# Patient Record
Sex: MALE | Race: WHITE
[De-identification: names, ages, dates, MRNs, and addresses within clinical notes are randomized per-mention and may not be internally consistent; named-entity substitution may affect disease eponyms.]

---

## 2018-07-31 ENCOUNTER — HOSPITAL ENCOUNTER (OUTPATIENT)
Dept: HOSPITAL 45 - C.GI | Age: 33
Discharge: HOME | End: 2018-07-31
Attending: INTERNAL MEDICINE
Payer: COMMERCIAL

## 2018-07-31 VITALS
HEIGHT: 75 IN | WEIGHT: 170.35 LBS | BODY MASS INDEX: 21.18 KG/M2 | HEIGHT: 75 IN | BODY MASS INDEX: 21.18 KG/M2 | BODY MASS INDEX: 21.18 KG/M2 | WEIGHT: 170.35 LBS

## 2018-07-31 VITALS — SYSTOLIC BLOOD PRESSURE: 135 MMHG | DIASTOLIC BLOOD PRESSURE: 85 MMHG | OXYGEN SATURATION: 100 % | HEART RATE: 53 BPM

## 2018-07-31 DIAGNOSIS — Z87.19: Primary | ICD-10-CM

## 2018-07-31 DIAGNOSIS — Z91.012: ICD-10-CM

## 2018-07-31 DIAGNOSIS — K44.9: ICD-10-CM

## 2018-07-31 DIAGNOSIS — K22.2: ICD-10-CM

## 2018-07-31 NOTE — DISCHARGE INSTRUCTIONS
Endoscopy Patient Instructions


Date / Procedure(s) Performed


Jul 31, 2018.


EGD





Allergy Information


Coded Allergies:  


     Eggs or Egg-derived Products (Verified  Allergy, Severe, ANAPHYLAXIS, 7/31/ 18)


     NO KNOWN DRUG ALLERGIES (Verified  Allergy, Unknown, NONE, 7/31/18)





Discharge Date / Findings


Jul 31, 2018.


Schatzki's Ring (non-obstructing)


Hiatal hernia


Distal esophageal biopsies





Medication Instructions


OK to resume all medications today as prescribed





Reported Home Medications








 Medications  Dose


 Route/Sig


 Max Daily Dose Days Date Category


 


 Tylenol


  (Acetaminophen)


 325 Mg Tab  650 Mg


 PO PRN


    7/25/18 Reported


 


 Omeprazole 20 Mg


 Tab  1 Tab


 PO BID


    9/6/16 Reported











Provider Instructions





Activity Restrictions





-  No exercising or heavy lifting for 24 hours. 


-  Do not drink alcohol the day of the procedure.


-  Do not drive a car or operate machinery until the day after the procedure.


-  Do not make any important decisions or sign important papers in 24 hours 

after the procedure.





Following Day:





-  Return to full activity which may include returning to work/school.





Diet





Start your diet with liquids and light foods (jello, soup, juice, toast).  Then 

eat your usual diet if not nauseated.





Treatment For Common After Affects





For mild abdominal pain, bloating, or excessive gas:





-  Rest


-  Eat lightly


-  Lie on right side





Follow-Up Information


Follow-up with Dr. Nicloe Díaz as scheduled





Anesthesia Information





What You Should Know





You have had a procedure that required some medicine to reduce anxiety and 

discomfort. This treatment is called moderate sedation.  


After receiving the treatment, you may be sleepy, but you will be able to 

breathe on your own.  The effects of the treatment may last for several hours.








Follow these instructions along with Activity/Diet recommendations noted above:





*  Do NOT do anything where dizziness or clumsiness would be dangerous.





*  Rest quietly at home today, then you can be up and about tomorrow.





*  Have a responsible person stay with you the rest of today.





*  You may have had an I.V. today.  If so, you may take the dressing off later 

today.





Recommendations


 


Call your doctor if:





*  Trouble breathing 





*  Continuous vomiting for more than 24 hours








*  Temperature above 101 degrees





*  Severe abdominal pain or bloating





*  Pain not relieved by pain medicine ordered





*  There is increased drainage or redness from any incision





*  A large amount of rectal bleeding greater than 2-3 tablespoons. 


   (If you had a polyp/s removed or have hemorrhoids, a small amount of blood -


    from the rectum is to be expected.)





*  You have any unanswered questions or concerns.








IN THE EVENT OF A SERIOUS EMERGENCY, GO TO THE NEAREST EMERGENCY ROOM








       Your discharge instructions were prepared by provider Mio Menjivar.





 Patient Instructions Signature Page














Boyd De Jesus 











Patient (or Guardian) Signature/Date:____________________________________ I 

have read and understand the instructions given to me by my caregivers.








Caregiver/RN/Doctor Signature/Date:____________________________________











The above-named patient and/or guardian has received patient instructions on 

this date.





























+  Original Patient Signature Page (only) stays with chart.  Please make copy 

for patient.

## 2018-07-31 NOTE — ENDO HISTORY AND PHYSICAL
History & Physical


Date of Service:


Jul 31, 2018.


Chief Complaint:


hx barretts esophagus


Referring Physician:


Dr. Nicole Díaz


History of Present Illness


32 yo CM who presents for EGD secondary to Vela's Esophagus.





Past Surgical History


Hx Cardiac Surgery:  No


Hx Internal Defibrillator:  No


Hx Pacemaker:  No


Hx Abdominal Surgery:  No


Hx of Implantable Prosthesis:  No


Hx Post-Op Nausea and Vomiting:  No


Hx Cancer Surgery:  No


Hx Thoracic Surgery:  No


Hx Orthopedic:  No


Hx Urinary Tract Surgery:  No





Family History


IBD





Social History


Smoking Status:  Never Smoker


Hx Substance Use:  No


Hx Alcohol Use:  Yes (OCC SOCIAL)





Allergies


Coded Allergies:  


     Eggs or Egg-derived Products (Verified  Allergy, Severe, ANAPHYLAXIS, 7/31/ 18)


     NO KNOWN DRUG ALLERGIES (Verified  Allergy, Unknown, NONE, 7/31/18)





Current Medications





Reported Home Medications








 Medications  Dose


 Route/Sig


 Max Daily Dose Days Date Category


 


 Tylenol


  (Acetaminophen)


 325 Mg Tab  650 Mg


 PO PRN


    7/25/18 Reported


 


 Omeprazole 20 Mg


 Tab  1 Tab


 PO BID


    9/6/16 Reported











Vital Signs


Weight (Kilograms):  77.27


Height (Feet):  6


Height (Inches):  3











  Date Time  Temp Pulse Resp B/P (MAP) Pulse Ox O2 Delivery O2 Flow Rate FiO2


 


7/31/18 08:35 36.1 57 20 123/80 (94) 100 Room Air  











Physical Exam


General Appearance:  WD/WN, no apparent distress


Respiratory/Chest:  


   Auscultation:  breath sounds normal


Cardiovascular:  


   Heart Auscultation:  RRR


Abdomen:  


   Bowel Sounds:  normal


   Inspection & Palpation:  soft, non-distended, no tenderness, guarding & 

rebound





Assessment and Plan


Assessment:


32 yo CM who presents for EGD secondary to Vela's Esophagus.











Plan:


Proceed with EGD.

## 2018-07-31 NOTE — ANESTHESIOLOGY PROGRESS NOTE
Anesthesia Post Op Note


Date & Time


Jul 31, 2018 at 10:02





Vital Signs


Pain Intensity:  0





Vital Signs Past 12 Hours








  Date Time  Temp Pulse Resp B/P (MAP) Pulse Ox O2 Delivery O2 Flow Rate FiO2


 


7/31/18 09:50  51 20 137/87 (104) 100 Room Air  


 


7/31/18 09:37  60 18 129/80 (96) 100 Room Air  


 


7/31/18 08:35 36.1 57 20 123/80 (94) 100 Room Air  











Notes


Mental Status:  alert / awake / arousable, participated in evaluation


Pt Amnestic to Procedure:  Yes


Nausea / Vomiting:  adequately controlled


Pain:  adequately controlled


Airway Patency, RR, SpO2:  stable & adequate


BP & HR:  stable & adequate


Hydration State:  stable & adequate


Anesthetic Complications:  no major complications apparent

## 2018-07-31 NOTE — GI REPORT
Patient Name: Boyd De Jesus

Procedure Date: 7/31/2018 9:03 AM

MRN: [id number]

Account Number: [id number]

YOB: 1985

Admit Type: Outpatient

Age: 33

Gender: Male

Attending MD: Mio Menjivar DO

Procedure:            Upper GI endoscopy

Providers:            Mio Menjivar DO

Referring MD:         Nicole Díaz

Indications:          Suspected Vela's esophagus

Medicines:            Monitored Anesthesia Care

Complications:        No immediate complications.

Estimated Blood Loss: Estimated blood loss: none.

Procedure:            Pre-Anesthesia Assessment:

                      - Prior to the procedure, a History and Physical was 

                      performed, and patient medications and allergies were 

                      reviewed. The patient's tolerance of previous 

                      anesthesia was also reviewed. The risks and benefits of 

                      the procedure and the sedation options and risks were 

                      discussed with the patient. All questions were 

                      answered, and informed consent was obtained. Prior 

                      Anticoagulants: The patient has taken no previous 

                      anticoagulant or antiplatelet agents. ASA Grade 

                      Assessment: I - A normal, healthy patient. After 

                      reviewing the risks and benefits, the patient was 

                      deemed in satisfactory condition to undergo the 

                      procedure.

                      After obtaining informed consent, the endoscope was 

                      passed under direct vision. Throughout the procedure, 

                      the patient's blood pressure, pulse, and oxygen 

                      saturations were monitored continuously. The scope was 

                      introduced through the mouth, and advanced to the 

                      second part of duodenum. The upper GI endoscopy was 

                      accomplished without difficulty. The patient tolerated 

                      the procedure well.

Findings:

     A non-obstructing Schatzki ring (acquired) was found at the 

     gastroesophageal junction. Biopsies were taken with a cold forceps for 

     histology.

     A small hiatal hernia was present.

     The examined duodenum was normal.

Impression:           - Non-obstructing Schatzki ring. Biopsied.

                      - Small hiatal hernia.

                      - Normal examined duodenum.

Recommendation:       - Resume previous diet.

                      - Continue present medications.

                      - Await pathology results.

                      - Return to primary care physician as previously 

                      scheduled.

Mio Menjivar DO

7/31/2018 9:35:30 AM

This report has been signed electronically.

Note Initiated On: 7/31/2018 9:03 AM

Number of Addenda: 0

     I attest to the content of the Intraoperative Record and orders 

     documented therein, exceptions below



{[id number]}